# Patient Record
Sex: FEMALE | Race: WHITE | NOT HISPANIC OR LATINO | URBAN - METROPOLITAN AREA
[De-identification: names, ages, dates, MRNs, and addresses within clinical notes are randomized per-mention and may not be internally consistent; named-entity substitution may affect disease eponyms.]

---

## 2017-03-21 ENCOUNTER — IMPORTED ENCOUNTER (OUTPATIENT)
Dept: URBAN - METROPOLITAN AREA CLINIC 31 | Facility: CLINIC | Age: 82
End: 2017-03-21

## 2017-03-21 PROBLEM — H02.831: Noted: 2017-03-21

## 2017-03-21 PROBLEM — H02.423: Noted: 2017-03-21

## 2017-03-21 PROBLEM — H53.462: Noted: 2017-03-21

## 2017-03-21 PROBLEM — H40.11X2: Noted: 2017-03-21

## 2017-03-21 PROBLEM — H02.834: Noted: 2017-03-21

## 2017-03-21 PROCEDURE — 99214 OFFICE O/P EST MOD 30 MIN: CPT

## 2017-03-21 NOTE — PATIENT DISCUSSION
1.  Dermatochalasis OU:  Patient currently asymptomatic. Observe. 2. Myogenic Ptosis OU --  The patient has droopy upper eyelids. According to the patient and/or compared to old photographs of the patient the condition is old and stable. Therefore observation was recommended. 3.  1.  Primary Open Angle Glaucoma OU - Continue with current treatment plan. Discussed importance of compliance. Will continue to monitor. 4 mos IOP check. VF?4. 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

## 2017-07-25 ENCOUNTER — IMPORTED ENCOUNTER (OUTPATIENT)
Dept: URBAN - METROPOLITAN AREA CLINIC 31 | Facility: CLINIC | Age: 82
End: 2017-07-25

## 2017-07-25 PROBLEM — H40.1132: Noted: 2017-07-25

## 2017-07-25 PROCEDURE — 99213 OFFICE O/P EST LOW 20 MIN: CPT

## 2017-07-25 NOTE — PATIENT DISCUSSION
1.  Primary Open Angle Glaucoma OU - Continue with current treatment plan. Discussed importance of compliance. Will continue to monitor. 2.  Return for an appointment in 6 months for comprehensive exam. VF 24-2. Fundus Photo Disc. with Dr. Edinson Wong.

## 2018-01-24 ENCOUNTER — IMPORTED ENCOUNTER (OUTPATIENT)
Dept: URBAN - METROPOLITAN AREA CLINIC 31 | Facility: CLINIC | Age: 83
End: 2018-01-24

## 2018-01-24 PROBLEM — H40.1132: Noted: 2018-01-24

## 2018-01-24 PROBLEM — H02.831: Noted: 2018-01-24

## 2018-01-24 PROBLEM — H02.834: Noted: 2018-01-24

## 2018-01-24 PROBLEM — H53.462: Noted: 2018-01-24

## 2018-01-24 PROCEDURE — 92014 COMPRE OPH EXAM EST PT 1/>: CPT

## 2018-01-24 NOTE — PATIENT DISCUSSION
1.  Dermatochalasis OU:  Patient currently asymptomatic. Observe. 2. Primary Open Angle Glaucoma OU moderate - Continue with current treatment plan. Discussed importance of compliance. Will continue to monitor. 6 mos TA3. 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

## 2018-07-25 ENCOUNTER — IMPORTED ENCOUNTER (OUTPATIENT)
Dept: URBAN - METROPOLITAN AREA CLINIC 31 | Facility: CLINIC | Age: 83
End: 2018-07-25

## 2018-07-25 PROBLEM — H40.1132: Noted: 2018-07-25

## 2018-07-25 PROCEDURE — 99213 OFFICE O/P EST LOW 20 MIN: CPT

## 2018-07-25 NOTE — PATIENT DISCUSSION
Primary Open Angle Glaucoma OU moderate - Continue with current treatment plan. Discussed importance of compliance. Will continue to monitor.

## 2019-01-28 ENCOUNTER — IMPORTED ENCOUNTER (OUTPATIENT)
Dept: URBAN - METROPOLITAN AREA CLINIC 31 | Facility: CLINIC | Age: 84
End: 2019-01-28

## 2019-01-28 PROBLEM — E11.9: Noted: 2019-01-28

## 2019-01-28 PROBLEM — H40.1132: Noted: 2019-01-28

## 2019-01-28 PROBLEM — H53.462: Noted: 2019-01-28

## 2019-01-28 PROCEDURE — 92014 COMPRE OPH EXAM EST PT 1/>: CPT

## 2019-01-28 NOTE — PATIENT DISCUSSION
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

## 2019-01-28 NOTE — PATIENT DISCUSSION
1.  Primary Open Angle Glaucoma OU moderate - Continue with current treatment plan. Discussed importance of compliance. Will continue to monitor. 6 mos TA check. Continue present drops. 2.  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 Stressed the importance of regular followup and good control of BS BP and Lipids to avoid future complications. 3. 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

## 2019-06-18 ENCOUNTER — IMPORTED ENCOUNTER (OUTPATIENT)
Dept: URBAN - METROPOLITAN AREA CLINIC 31 | Facility: CLINIC | Age: 84
End: 2019-06-18

## 2019-06-18 PROBLEM — H40.1132: Noted: 2019-06-18

## 2019-06-18 PROCEDURE — 99213 OFFICE O/P EST LOW 20 MIN: CPT

## 2019-06-18 NOTE — PATIENT DISCUSSION
Primary Open Angle Glaucoma OU moderate - Continue with current treatment plan. Discussed importance of compliance. Will continue to monitor.    6 mos CE.

## 2020-01-13 ENCOUNTER — IMPORTED ENCOUNTER (OUTPATIENT)
Dept: URBAN - METROPOLITAN AREA CLINIC 31 | Facility: CLINIC | Age: 85
End: 2020-01-13

## 2020-01-13 PROBLEM — Z96.1: Noted: 2020-01-13

## 2020-01-13 PROBLEM — E11.9: Noted: 2020-01-13

## 2020-01-13 PROBLEM — H40.1132: Noted: 2020-01-13

## 2020-01-13 PROCEDURE — 92014 COMPRE OPH EXAM EST PT 1/>: CPT

## 2020-01-13 PROCEDURE — 92250 FUNDUS PHOTOGRAPHY W/I&R: CPT

## 2020-01-13 NOTE — PATIENT DISCUSSION
1.  Primary Open Angle Glaucoma OU moderate -  Continue with current treatment plan. Discussed importance of compliance. Will continue to monitor for stability or progression. 2.  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 Stressed the importance of regular followup and good control of BS BP and Lipids to avoid future complications. 3. Pseudophakia OU - IOLs stable. Monitor for changes in vision. 4.   S/P CVA with Left side VF cut

## 2022-04-02 ASSESSMENT — VISUAL ACUITY
OD_SC: 20/40
OS_CC: J314''
OD_SC: 20/25-1
OD_SC: 20/30+2
OD_SC: 20/25
OD_CC: J314''
OS_SC: 20/40-2
OD_SC: 20/40
OS_SC: 20/60+2
OD_SC: 20/30
OS_SC: 20/60-2
OS_SC: 20/60
OD_SC: 20/30
OS_SC: 20/30
OS_SC: 20/40
OS_SC: 20/30+2

## 2022-04-02 ASSESSMENT — TONOMETRY
OS_IOP_MMHG: 14
OS_IOP_MMHG: 17
OS_IOP_MMHG: 16
OD_IOP_MMHG: 16
OD_IOP_MMHG: 15
OD_IOP_MMHG: 16
OS_IOP_MMHG: 15
OD_IOP_MMHG: 14
OD_IOP_MMHG: 15
OS_IOP_MMHG: 16

## 2022-07-30 ENCOUNTER — TELEPHONE ENCOUNTER (OUTPATIENT)
Age: 87
End: 2022-07-30

## 2022-07-31 ENCOUNTER — TELEPHONE ENCOUNTER (OUTPATIENT)
Age: 87
End: 2022-07-31